# Patient Record
Sex: MALE | Race: WHITE | NOT HISPANIC OR LATINO | Employment: FULL TIME | URBAN - METROPOLITAN AREA
[De-identification: names, ages, dates, MRNs, and addresses within clinical notes are randomized per-mention and may not be internally consistent; named-entity substitution may affect disease eponyms.]

---

## 2023-03-03 LAB
ALANINE AMINOTRANSFERASE (SGPT) (U/L) IN SER/PLAS: 35 U/L (ref 10–52)
ALBUMIN (G/DL) IN SER/PLAS: 4.5 G/DL (ref 3.4–5)
ALKALINE PHOSPHATASE (U/L) IN SER/PLAS: 61 U/L (ref 33–120)
ANION GAP IN SER/PLAS: 12 MMOL/L (ref 10–20)
ASPARTATE AMINOTRANSFERASE (SGOT) (U/L) IN SER/PLAS: 24 U/L (ref 9–39)
BILIRUBIN TOTAL (MG/DL) IN SER/PLAS: 0.7 MG/DL (ref 0–1.2)
CALCIUM (MG/DL) IN SER/PLAS: 9.4 MG/DL (ref 8.6–10.3)
CARBON DIOXIDE, TOTAL (MMOL/L) IN SER/PLAS: 24 MMOL/L (ref 21–32)
CHLORIDE (MMOL/L) IN SER/PLAS: 106 MMOL/L (ref 98–107)
CHOLESTEROL (MG/DL) IN SER/PLAS: 145 MG/DL (ref 0–199)
CHOLESTEROL IN HDL (MG/DL) IN SER/PLAS: 49.8 MG/DL
CHOLESTEROL/HDL RATIO: 2.9
CREATININE (MG/DL) IN SER/PLAS: 0.9 MG/DL (ref 0.5–1.3)
GFR MALE: >90 ML/MIN/1.73M2
GLUCOSE (MG/DL) IN SER/PLAS: 98 MG/DL (ref 74–99)
LDL: 76 MG/DL (ref 0–99)
POTASSIUM (MMOL/L) IN SER/PLAS: 4.3 MMOL/L (ref 3.5–5.3)
PROTEIN TOTAL: 7.1 G/DL (ref 6.4–8.2)
SODIUM (MMOL/L) IN SER/PLAS: 138 MMOL/L (ref 136–145)
TRIGLYCERIDE (MG/DL) IN SER/PLAS: 95 MG/DL (ref 0–149)
UREA NITROGEN (MG/DL) IN SER/PLAS: 19 MG/DL (ref 6–23)
VLDL: 19 MG/DL (ref 0–40)

## 2023-03-04 LAB
ESTIMATED AVERAGE GLUCOSE FOR HBA1C: 114 MG/DL
HEMOGLOBIN A1C/HEMOGLOBIN TOTAL IN BLOOD: 5.6 %

## 2023-05-19 LAB
ACTIVATED PARTIAL THROMBOPLASTIN TIME IN PPP BY COAGULATION ASSAY: 33 SEC (ref 26–39)
ALANINE AMINOTRANSFERASE (SGPT) (U/L) IN SER/PLAS: 31 U/L (ref 10–52)
ALBUMIN (G/DL) IN SER/PLAS: 4.4 G/DL (ref 3.4–5)
ALKALINE PHOSPHATASE (U/L) IN SER/PLAS: 59 U/L (ref 33–120)
ANION GAP IN SER/PLAS: 10 MMOL/L (ref 10–20)
ASPARTATE AMINOTRANSFERASE (SGOT) (U/L) IN SER/PLAS: 23 U/L (ref 9–39)
BASOPHILS (10*3/UL) IN BLOOD BY AUTOMATED COUNT: 0.05 X10E9/L (ref 0–0.1)
BASOPHILS/100 LEUKOCYTES IN BLOOD BY AUTOMATED COUNT: 1 % (ref 0–2)
BILIRUBIN DIRECT (MG/DL) IN SER/PLAS: 0.1 MG/DL (ref 0–0.3)
BILIRUBIN TOTAL (MG/DL) IN SER/PLAS: 0.4 MG/DL (ref 0–1.2)
CALCIUM (MG/DL) IN SER/PLAS: 9.4 MG/DL (ref 8.6–10.3)
CARBON DIOXIDE, TOTAL (MMOL/L) IN SER/PLAS: 26 MMOL/L (ref 21–32)
CHLORIDE (MMOL/L) IN SER/PLAS: 106 MMOL/L (ref 98–107)
CREATININE (MG/DL) IN SER/PLAS: 0.91 MG/DL (ref 0.5–1.3)
EOSINOPHILS (10*3/UL) IN BLOOD BY AUTOMATED COUNT: 0.14 X10E9/L (ref 0–0.7)
EOSINOPHILS/100 LEUKOCYTES IN BLOOD BY AUTOMATED COUNT: 2.9 % (ref 0–6)
ERYTHROCYTE DISTRIBUTION WIDTH (RATIO) BY AUTOMATED COUNT: 13 % (ref 11.5–14.5)
ERYTHROCYTE MEAN CORPUSCULAR HEMOGLOBIN CONCENTRATION (G/DL) BY AUTOMATED: 33.3 G/DL (ref 32–36)
ERYTHROCYTE MEAN CORPUSCULAR VOLUME (FL) BY AUTOMATED COUNT: 93 FL (ref 80–100)
ERYTHROCYTES (10*6/UL) IN BLOOD BY AUTOMATED COUNT: 5.48 X10E12/L (ref 4.5–5.9)
GFR MALE: >90 ML/MIN/1.73M2
GLUCOSE (MG/DL) IN SER/PLAS: 99 MG/DL (ref 74–99)
HEMATOCRIT (%) IN BLOOD BY AUTOMATED COUNT: 50.7 % (ref 41–52)
HEMOGLOBIN (G/DL) IN BLOOD: 16.9 G/DL (ref 13.5–17.5)
IMMATURE GRANULOCYTES/100 LEUKOCYTES IN BLOOD BY AUTOMATED COUNT: 0.2 % (ref 0–0.9)
INR IN PPP BY COAGULATION ASSAY: 0.9 (ref 0.9–1.1)
LEUKOCYTES (10*3/UL) IN BLOOD BY AUTOMATED COUNT: 4.8 X10E9/L (ref 4.4–11.3)
LIPASE (U/L) IN SER/PLAS: 27 U/L (ref 9–82)
LYMPHOCYTES (10*3/UL) IN BLOOD BY AUTOMATED COUNT: 1.28 X10E9/L (ref 1.2–4.8)
LYMPHOCYTES/100 LEUKOCYTES IN BLOOD BY AUTOMATED COUNT: 26.7 % (ref 13–44)
MONOCYTES (10*3/UL) IN BLOOD BY AUTOMATED COUNT: 0.36 X10E9/L (ref 0.1–1)
MONOCYTES/100 LEUKOCYTES IN BLOOD BY AUTOMATED COUNT: 7.5 % (ref 2–10)
NEUTROPHILS (10*3/UL) IN BLOOD BY AUTOMATED COUNT: 2.95 X10E9/L (ref 1.2–7.7)
NEUTROPHILS/100 LEUKOCYTES IN BLOOD BY AUTOMATED COUNT: 61.7 % (ref 40–80)
PLATELETS (10*3/UL) IN BLOOD AUTOMATED COUNT: 247 X10E9/L (ref 150–450)
POTASSIUM (MMOL/L) IN SER/PLAS: 4.2 MMOL/L (ref 3.5–5.3)
PROTEIN TOTAL: 7.1 G/DL (ref 6.4–8.2)
PROTHROMBIN TIME (PT) IN PPP BY COAGULATION ASSAY: 10.6 SEC (ref 9.8–13.4)
SODIUM (MMOL/L) IN SER/PLAS: 138 MMOL/L (ref 136–145)
UREA NITROGEN (MG/DL) IN SER/PLAS: 22 MG/DL (ref 6–23)

## 2023-06-07 ENCOUNTER — HOSPITAL ENCOUNTER (OUTPATIENT)
Dept: DATA CONVERSION | Facility: HOSPITAL | Age: 48
End: 2023-06-08
Attending: SURGERY | Admitting: SURGERY
Payer: COMMERCIAL

## 2023-06-07 DIAGNOSIS — E66.9 OBESITY, UNSPECIFIED: ICD-10-CM

## 2023-06-07 DIAGNOSIS — E78.5 HYPERLIPIDEMIA, UNSPECIFIED: ICD-10-CM

## 2023-06-07 DIAGNOSIS — I10 ESSENTIAL (PRIMARY) HYPERTENSION: ICD-10-CM

## 2023-06-07 DIAGNOSIS — K42.9 UMBILICAL HERNIA WITHOUT OBSTRUCTION OR GANGRENE: ICD-10-CM

## 2023-06-07 DIAGNOSIS — K81.1 CHRONIC CHOLECYSTITIS: ICD-10-CM

## 2023-06-07 LAB — POCT GLUCOSE: 104 MG/DL (ref 74–99)

## 2023-07-03 ENCOUNTER — HOSPITAL ENCOUNTER (OUTPATIENT)
Dept: DATA CONVERSION | Facility: HOSPITAL | Age: 48
End: 2023-07-03
Attending: INTERNAL MEDICINE | Admitting: INTERNAL MEDICINE
Payer: COMMERCIAL

## 2023-07-03 DIAGNOSIS — E78.5 HYPERLIPIDEMIA, UNSPECIFIED: ICD-10-CM

## 2023-07-03 DIAGNOSIS — K63.5 POLYP OF COLON: ICD-10-CM

## 2023-07-03 DIAGNOSIS — K64.0 FIRST DEGREE HEMORRHOIDS: ICD-10-CM

## 2023-07-03 DIAGNOSIS — I10 ESSENTIAL (PRIMARY) HYPERTENSION: ICD-10-CM

## 2023-07-03 DIAGNOSIS — K57.30 DIVERTICULOSIS OF LARGE INTESTINE WITHOUT PERFORATION OR ABSCESS WITHOUT BLEEDING: ICD-10-CM

## 2023-07-03 DIAGNOSIS — Z86.010 PERSONAL HISTORY OF COLONIC POLYPS: ICD-10-CM

## 2023-07-03 DIAGNOSIS — Z12.11 ENCOUNTER FOR SCREENING FOR MALIGNANT NEOPLASM OF COLON: ICD-10-CM

## 2023-07-07 LAB
COMPLETE PATHOLOGY REPORT: NORMAL
CONVERTED CLINICAL DIAGNOSIS-HISTORY: NORMAL
CONVERTED FINAL DIAGNOSIS: NORMAL
CONVERTED FINAL REPORT PDF LINK TO COPY AND PASTE: NORMAL
CONVERTED GROSS DESCRIPTION: NORMAL

## 2023-08-02 LAB
ALANINE AMINOTRANSFERASE (SGPT) (U/L) IN SER/PLAS: 34 U/L (ref 10–52)
ALBUMIN (G/DL) IN SER/PLAS: 4.8 G/DL (ref 3.4–5)
ALKALINE PHOSPHATASE (U/L) IN SER/PLAS: 62 U/L (ref 33–120)
ANION GAP IN SER/PLAS: 14 MMOL/L (ref 10–20)
ASPARTATE AMINOTRANSFERASE (SGOT) (U/L) IN SER/PLAS: 25 U/L (ref 9–39)
BILIRUBIN TOTAL (MG/DL) IN SER/PLAS: 1 MG/DL (ref 0–1.2)
CALCIUM (MG/DL) IN SER/PLAS: 10.2 MG/DL (ref 8.6–10.3)
CARBON DIOXIDE, TOTAL (MMOL/L) IN SER/PLAS: 26 MMOL/L (ref 21–32)
CHLORIDE (MMOL/L) IN SER/PLAS: 102 MMOL/L (ref 98–107)
CHOLESTEROL (MG/DL) IN SER/PLAS: 165 MG/DL (ref 0–199)
CHOLESTEROL IN HDL (MG/DL) IN SER/PLAS: 47.6 MG/DL
CHOLESTEROL/HDL RATIO: 3.5
CREATININE (MG/DL) IN SER/PLAS: 0.84 MG/DL (ref 0.5–1.3)
GFR MALE: >90 ML/MIN/1.73M2
GLUCOSE (MG/DL) IN SER/PLAS: 96 MG/DL (ref 74–99)
LDL: 78 MG/DL (ref 0–99)
POTASSIUM (MMOL/L) IN SER/PLAS: 4.3 MMOL/L (ref 3.5–5.3)
PROTEIN TOTAL: 7.7 G/DL (ref 6.4–8.2)
SODIUM (MMOL/L) IN SER/PLAS: 138 MMOL/L (ref 136–145)
TRIGLYCERIDE (MG/DL) IN SER/PLAS: 196 MG/DL (ref 0–149)
UREA NITROGEN (MG/DL) IN SER/PLAS: 20 MG/DL (ref 6–23)
VLDL: 39 MG/DL (ref 0–40)

## 2023-08-03 LAB
ESTIMATED AVERAGE GLUCOSE FOR HBA1C: 123 MG/DL
HEMOGLOBIN A1C/HEMOGLOBIN TOTAL IN BLOOD: 5.9 %

## 2023-09-07 VITALS — HEIGHT: 72 IN | BODY MASS INDEX: 33.44 KG/M2 | WEIGHT: 246.91 LBS

## 2023-09-29 VITALS — HEIGHT: 72 IN | BODY MASS INDEX: 33.92 KG/M2 | WEIGHT: 250.44 LBS

## 2023-09-30 NOTE — H&P
History & Physical Reviewed:   I have reviewed the History and Physical dated:  19-May-2023   History and Physical reviewed and relevant findings noted. Patient examined to review pertinent physical  findings.: No significant changes   Home Medications Reviewed: no changes noted   Allergies Reviewed: no changes noted       ERAS (Enhanced Recovery After Surgery):  ·  ERAS Patient: no     Consent:   COVID-19 Consent:  ·  COVID-19 Risk Consent Surgeon has reviewed key risks related to the risk of estefnai COVID-19 and if they contract COVID-19 what the risks are.       Electronic Signatures:  Giorgi Trevino)  (Signed 07-Jun-2023 09:03)   Authored: History & Physical Reviewed, ERAS, Consent,  Note Completion      Last Updated: 07-Jun-2023 09:03 by Giorgi Trevino)

## 2023-09-30 NOTE — DISCHARGE SUMMARY
Send Summary:   Discharge Summary Providers:  Provider Role Provider Name   · Primary Carin Linda       Note Recipients: Carin Linda, PAC - [phone]  []         Discharge:    Summary:   Admission Date: .07-Jun-2023 08:26:00   Discharge Date: 08-Jun-2023   Attending Physician at Discharge: Uriel   Admission Reason: Lap eren and umbilical hernia  repair   Final Discharge Diagnoses: S/P laparoscopic cholecystectomy   Procedures: Date: 07-Jun-2023 12:32:00  Procedure Name: 1.  Laparoscopic cholecystectomy, umbilical hernia repair   Condition at Discharge: Stable   Disposition at Discharge: Stable   Vital Signs:        T   P  R  BP   MAP  SpO2   Value  36.7  84  18  129/73      96%  Date/Time 6/8 6:22 6/8 6:22 6/8 6:22 6/8 6:22    6/8 6:22  Range  (35.8C - 37.1C )  (76 - 91 )  (18 - 18 )  (119 - 151 )/ (69 - 83 )    (95% - 98% )  Highest temp of 37.1 C was recorded at 6/7 22:09    Date:            Weight/Scale Type:  Height:   07-Jun-2023 14:32  112  kg         182.8  cm    Physical Exam:    Abd soft, appropriately tender, epigastric wound packing changed with iodoform - bloody drainage  Umbilical wound dressing in place  All other wounds with skin glue intact    Hospital Course:     was admitted postoperatively for wound care management and to monitor his VS.  His hospital course was expected.  Was tolerating diet with PO intake, no  n/v, pain was controlled with PO medication and he remained AVSS.  Was deemed suitable for discharge on POD1.         Discharge Information:    and Continuing Care:   Lab Results - Pending:    Surgical Pathology Drawn at 07-Jun-2023 12:16:00  Surgical Pathology Drawn at 07-Jun-2023 12:06:00  Radiology Results - Pending: None   Discharge Instructions:    Activity:           activity as tolerated.          May shower..  Friday 9th June 2023          May not return to school/work until follow-up visit with.            May not drive while taking  narcotics.            No pushing, pulling, or lifting objects greater than 10 pounds for 6.            No tub baths, no swimming until seen in office    Nutrition/Diet:           regular    Wound Care:           Wound Type:   surgical incision          Change Dressing:   as needed          Cleanse With:   soap and water          Pack With:   nu-gauze / packing strips,  Change twice a day          Cover With:   4x4 gauze,  no dressing, leave open to air          Tape With:   paper tape          Instructions:   no lotions, creams, or tub soaks          Other Instructions:   Change packing twice a day until seen in the office.  Can remove belly-button dressing on 9th June and keep open to air.  Keep all other wounds open to air.  Skin glue will peel off on its own.    Follow Up Appointments:    Follow-Up Appointment 01:           Physician/Dept/Service:   /Surgery          Scheduled Date/Time:   15-Ahmet-2023 11:00          Location:   [facility], [address] [place] [address], [address],          Phone Number:   [phone]    Discharge Medications: Home Medication    mg oral tablet - 1 tab(s) orally 3 times a day   atorvastatin 20 mg oral tablet - 1 tab(s) orally once a day  lisinopril 40 mg oral tablet - 1 tab(s) orally once a day  topiramate 50 mg oral tablet - 1 tab(s) orally 2 times a day  Tylenol 325 mg oral tablet - 2 tab(s) orally every 6 hours   amoxicillin-clavulanate 875 mg-125 mg oral tablet - 1 tab(s) orally 3 times a day   hydroCHLOROthiazide 12.5 mg oral tablet - 1 tab(s) orally once a day     PRN Medication   Colace 100 mg oral capsule - 1 cap(s) orally once a day, As Needed -for constipation   oxyCODONE 5 mg oral capsule - 1 cap(s) orally 4 times a day, As Needed -for breakthrough pain     DNR Status:   ·  Code Status Code Status order at time of discharge: Full Code       Electronic Signatures:  Giorgi Trevino)  (Signed 08-Jun-2023 08:17)   Authored:  Send Summary, Summary Content, Ongoing Care,  DNR Status, Note Completion      Last Updated: 08-Jun-2023 08:17 by Giorgi Trevino)

## 2023-09-30 NOTE — PROGRESS NOTES
Service: Acute Care Surgery     Subjective Data:   JOSE NJ is a 48 year old Male who is Hospital Day # 2 and POD #1 for 1.  Laparoscopic cholecystectomy, umbilical hernia repair ;     No issues overnight.  Jasbir liqs.  Voided multiple times.    Objective Data:     Objective Information:      T   P  R  BP   MAP  SpO2   Value  36.7  84  18  129/73      96%  Date/Time 6/8 6:22 6/8 6:22 6/8 6:22 6/8 6:22    6/8 6:22  Range  (35.8C - 37.1C )  (76 - 91 )  (18 - 18 )  (119 - 151 )/ (69 - 83 )    (95% - 98% )  Highest temp of 37.1 C was recorded at 6/7 22:09      Pain reported at 6/7 20:45: 3 = Mild    ---- Intake and Output  -----  Mn/Dy/Year Time  Intake   Output  Net  Jun 8, 2023 6:00 am  0   0  0  Jun 7, 2023 10:00 pm  350   0  350  Jun 7, 2023 2:00 pm  2340   550  1790    The Intake and Output Totals for the last 24 hours are:      Intake   Output  Net      4031   550  2140    Physical Exam Narrative:  ·  Physical Exam:    NAD  RRR  Resp soft  Abd soft, epigastric wound repacked; all other wounds dry with skin glue  Umbilical dressing intact  WWP    Medication:    Medications:          Continuous Medications       --------------------------------  No continuous medications are active       Scheduled Medications       --------------------------------    1. Acetaminophen:  650  mg  Oral  Every 4 Hours    2. Atorvastatin:  20  mg  Oral  Daily    3. Docusate:  100  mg  Oral  2 Times a Day    4. Enoxaparin SubCutaneous:  40  mg  SubCutaneous  Every 24 Hours    5. hydroCHLOROthiazide:  12.5  mg  Oral  Daily    6. Ketorolac Injectable:  15  mg  IntraVenous Push  Every 6 Hours    7. Lisinopril:  40  mg  Oral  Daily    8. Piperacillin - Tazobactam 3.375 gram/Iso-osmotic 50 mL Premix IVPB:  50  mL  IntraVenous Piggyback  Every 8 Hours    9. Topiramate:  50  mg  Oral  2 Times a Day         PRN Medications       --------------------------------    1. Acetaminophen:  650  mg  Oral  Every 4 Hours    2. Ondansetron  Injectable:  4  mg  IntraVenous Push  Every 4 Hours    3. oxyCODONE Immediate Release:  5  mg  Oral  Every 6 Hours    4. oxyCODONE Immediate Release:  10  mg  Oral  Every 6 Hours    5. Sodium Chloride 0.9% Injectable Flush:  10  mL  IntraVenous Flush  Every 8 Hours and as Needed           Currently Suspended Medications       --------------------------------    1. Ibuprofen:  400  mg  Oral  Every 8 Hours      Assessment and Plan:   Code Status:  ·  Code Status Full Code     Assessment:    48M POD1 s/p lap eren and UHR doing well  - reg diet  - dc IVF  - twice a day dressing changes for epigastric wound  - cont PO Abx for further 5 days  - f/u in office  - dc when patient ready      Electronic Signatures:  Giorgi Trevino)  (Signed 08-Jun-2023 07:59)   Authored: Service, Subjective Data, Objective Data, Assessment  and Plan, Note Completion      Last Updated: 08-Jun-2023 07:59 by Giorgi Trevino)

## 2023-10-02 NOTE — OP NOTE
PROCEDURE DETAILS    Preoperative Diagnosis:  Chronic cholecystitis, K81.1  Umbilical hernia, K42.9    Postoperative Diagnosis:  Chronic cholecystitis, K81.1  Umbilical hernia, K42.9    Surgeon: Giorgi Trevino  Resident/Fellow/Other Assistant: Brandi Bender    Procedure:  1.  Laparoscopic cholecystectomy, umbilical hernia repair     Anesthesia: No anesthesiologist associated with this case  Estimated Blood Loss: 50ml  Findings: Large distended gallbladder with large stone, spillage of gallbladder contents at liver bed and on attempt to remove it in the endobag through the subxiphoid port.    Specimens(s) Collected: yes,  Hilaria         Operative Report:   The patient was brought to the operating room and placed supine on the operating table.  Sequential compression devices were put on both lower extremities.   Arms were left untucked by the side of the patient.  IV antibiotics were infused.  An informal huddle was performed verifying the correct patient and procedures to be performed.  General endotracheal anaesthesia was induced.  A kumar catheter was placed  under sterile conditions.  The abdomen was prepped and draped in a sterile fashion. A formal timeout was performed.   Local anaesthetic agent was infiltrated intradermally just above the umbilicus superior to the hernia defect.  An incision was made in this supraumbilical region.  This was deepened down to the fascia which was then grasped with Kocher clamps.  A vertical  incision was made through the fascia and entry into the abdominal cavity was confirmed with a finger sweep and visual confirmation.  Two #0 vicryl stay sutures were placed on both aspects of the fascial incision.  The Stevens port was inserted.  The abdomen  was then insufflated to approximately 12-15 mmHg.  The camera and laparoscope were introduced through the Quin port.  The abdomen was then scanned - there no injuries noted on insertion of the port.  Three accessory  ports were placed under direct visualisation:  a 12mm port in the subxiphoid region, a 5mm port in the midclavicular line and inferior to the subcostal margin, and an additional 5mm port in the midaxillary line inferior to the subcostal margin.  The patient was then placed in reverse Trendelenburg  position and rolled to the left.   The assistant then grasped the fundus of the gallbladder pushing above the dome of liver. There were dense omental adhesions up to the gallbladder and to the liver. These were taken down using blunt dissection and cautery. This took some time to get it  freed up. We were able to finally get down to the area of the hepatoduodenal ligament. We had to cauterize the liver in several locations to control the bleeding. We were able then to dissect out the hepatoduodenal ligament. The cystic duct was identified  and visualized down to its junction with the common bile duct. The entire biliary window was dissected out. The cystic artery was identified. There were no accessory bile ducts, and the anatomy was well visualized. Two clips were placed proximally on  the cystic duct and one distally and then divided. Two clips were placed proximally on the cystic artery and one distally and then divided. The gallbladder was slowly and carefully taken off the liver bed using electrocautery.  At one point, we did enter  the gallbladder with spillage of bile which was suctioned off.  Once it was free, the liver bed was checked. There were additional bleeding points at the gallbladder bed.  This was controlled with electrocautery and Leena was also applied to this area.     The gallbladder was then placed in an endobag.  We initially attempted to remove the bag with its contents through the 12mm subxiphoid port but in doing so, spilled some of the contents into the wound.  We then elected to bring out the bag through the  umbilical port.  After doing so, the gallbladder bed was checked one last time -  there were no bleeding points.  The right upper quadrant was irrigated and suctioned dry. Each of the ports was removed under direct visualization. The abdomen was desufflated.   We then performed the umbilical hernia repair.  The patient had an umbilical defect that measured slightly larger than the tip of my last finger.   The incision was extended inferiorly around the umbilicus to gain better exposure around the defect.  Once this was done, the fascial defect was incorporated into the initial vertical incision placed for the Quin port.  The fascial defect was then  closed with interrupted #2-0 vicryl sutures.  Due to the spillage some gallbladder contents into the subxiphoid wound, we elected to keep the wound open but instead pack it with iodoform dressing.  All other skin incisions, including the umbilical hernia  site, were then closed with #4-0 monocryl.  Dermabond was placed on all incisions.  Once dry, and ABD pad was loosely placed over the umbilical incision and the abdominal binder was placed on the patient.  The patient was successfully extubated and brought  to the PACU in stable condition.  All needles, instruments, and sponges were correct to the count.  I was present for the entire procedure and performed it without residents but with surgical assistants.                          Attestation:   Note Completion:  Attending Attestation I performed the procedure without a resident         Electronic Signatures:  Giorgi Trevino)  (Signed 08-Jun-2023 12:10)   Authored: Post-Operative Note, Chart Review, Note Completion      Last Updated: 08-Jun-2023 12:10 by Giorgi Trevino)

## 2024-01-15 ENCOUNTER — LAB (OUTPATIENT)
Dept: LAB | Facility: LAB | Age: 49
End: 2024-01-15
Payer: COMMERCIAL

## 2024-01-15 ENCOUNTER — OFFICE VISIT (OUTPATIENT)
Dept: PRIMARY CARE | Facility: CLINIC | Age: 49
End: 2024-01-15
Payer: COMMERCIAL

## 2024-01-15 VITALS
SYSTOLIC BLOOD PRESSURE: 132 MMHG | HEART RATE: 94 BPM | DIASTOLIC BLOOD PRESSURE: 70 MMHG | BODY MASS INDEX: 34.72 KG/M2 | WEIGHT: 256.38 LBS | OXYGEN SATURATION: 97 % | HEIGHT: 72 IN

## 2024-01-15 DIAGNOSIS — R73.9 HYPERGLYCEMIA: ICD-10-CM

## 2024-01-15 DIAGNOSIS — I10 PRIMARY HYPERTENSION: Primary | ICD-10-CM

## 2024-01-15 DIAGNOSIS — E78.2 MIXED HYPERLIPIDEMIA: ICD-10-CM

## 2024-01-15 DIAGNOSIS — I10 PRIMARY HYPERTENSION: ICD-10-CM

## 2024-01-15 DIAGNOSIS — N52.9 ERECTILE DYSFUNCTION, UNSPECIFIED ERECTILE DYSFUNCTION TYPE: ICD-10-CM

## 2024-01-15 DIAGNOSIS — E66.9 OBESITY (BMI 30-39.9): ICD-10-CM

## 2024-01-15 PROBLEM — K42.9 UMBILICAL HERNIA: Status: RESOLVED | Noted: 2023-06-07 | Resolved: 2024-01-15

## 2024-01-15 PROBLEM — K63.5 POLYP OF COLON: Status: ACTIVE | Noted: 2024-01-15

## 2024-01-15 PROBLEM — Z86.0100 HISTORY OF COLONIC POLYPS: Status: ACTIVE | Noted: 2023-07-03

## 2024-01-15 PROBLEM — K82.9 GALLBLADDER PAIN: Status: RESOLVED | Noted: 2023-03-03 | Resolved: 2024-01-15

## 2024-01-15 PROBLEM — K57.90 DIVERTICULAR DISEASE: Status: ACTIVE | Noted: 2023-07-03

## 2024-01-15 PROBLEM — R10.11 RIGHT UPPER QUADRANT ABDOMINAL PAIN: Status: ACTIVE | Noted: 2024-01-15

## 2024-01-15 PROBLEM — E78.5 HYPERLIPIDEMIA: Status: ACTIVE | Noted: 2023-07-03

## 2024-01-15 PROBLEM — M19.90 ARTHRITIS: Status: ACTIVE | Noted: 2024-01-15

## 2024-01-15 PROBLEM — K64.9 HEMORRHOIDS: Status: ACTIVE | Noted: 2023-07-03

## 2024-01-15 PROBLEM — Z86.010 HISTORY OF COLONIC POLYPS: Status: ACTIVE | Noted: 2023-07-03

## 2024-01-15 PROBLEM — K82.9 GALLBLADDER ATTACK: Status: RESOLVED | Noted: 2024-01-15 | Resolved: 2024-01-15

## 2024-01-15 PROBLEM — F50.9 EATING DISORDER: Status: RESOLVED | Noted: 2024-01-15 | Resolved: 2024-01-15

## 2024-01-15 LAB
ALBUMIN SERPL BCP-MCNC: 4.7 G/DL (ref 3.4–5)
ALP SERPL-CCNC: 58 U/L (ref 33–120)
ALT SERPL W P-5'-P-CCNC: 34 U/L (ref 10–52)
ANION GAP SERPL CALC-SCNC: 13 MMOL/L (ref 10–20)
AST SERPL W P-5'-P-CCNC: 22 U/L (ref 9–39)
BILIRUB SERPL-MCNC: 0.7 MG/DL (ref 0–1.2)
BUN SERPL-MCNC: 24 MG/DL (ref 6–23)
CALCIUM SERPL-MCNC: 10.1 MG/DL (ref 8.6–10.3)
CHLORIDE SERPL-SCNC: 102 MMOL/L (ref 98–107)
CO2 SERPL-SCNC: 26 MMOL/L (ref 21–32)
CREAT SERPL-MCNC: 0.92 MG/DL (ref 0.5–1.3)
EGFRCR SERPLBLD CKD-EPI 2021: >90 ML/MIN/1.73M*2
GLUCOSE SERPL-MCNC: 108 MG/DL (ref 74–99)
POTASSIUM SERPL-SCNC: 4 MMOL/L (ref 3.5–5.3)
PROT SERPL-MCNC: 7.1 G/DL (ref 6.4–8.2)
SODIUM SERPL-SCNC: 137 MMOL/L (ref 136–145)

## 2024-01-15 PROCEDURE — 3075F SYST BP GE 130 - 139MM HG: CPT | Performed by: PHYSICIAN ASSISTANT

## 2024-01-15 PROCEDURE — 99214 OFFICE O/P EST MOD 30 MIN: CPT | Performed by: PHYSICIAN ASSISTANT

## 2024-01-15 PROCEDURE — 3078F DIAST BP <80 MM HG: CPT | Performed by: PHYSICIAN ASSISTANT

## 2024-01-15 PROCEDURE — 36415 COLL VENOUS BLD VENIPUNCTURE: CPT

## 2024-01-15 PROCEDURE — 83036 HEMOGLOBIN GLYCOSYLATED A1C: CPT

## 2024-01-15 PROCEDURE — 1036F TOBACCO NON-USER: CPT | Performed by: PHYSICIAN ASSISTANT

## 2024-01-15 PROCEDURE — 3008F BODY MASS INDEX DOCD: CPT | Performed by: PHYSICIAN ASSISTANT

## 2024-01-15 PROCEDURE — 80053 COMPREHEN METABOLIC PANEL: CPT

## 2024-01-15 RX ORDER — HYDROCHLOROTHIAZIDE 12.5 MG/1
12.5 TABLET ORAL DAILY
Qty: 90 TABLET | Refills: 1 | Status: SHIPPED | OUTPATIENT
Start: 2024-01-15 | End: 2024-07-13

## 2024-01-15 RX ORDER — TOPIRAMATE 50 MG/1
TABLET, FILM COATED ORAL
COMMUNITY
Start: 2020-02-03 | End: 2024-01-15 | Stop reason: SDUPTHER

## 2024-01-15 RX ORDER — LISINOPRIL 40 MG/1
40 TABLET ORAL DAILY
COMMUNITY
End: 2024-01-15 | Stop reason: SDUPTHER

## 2024-01-15 RX ORDER — SILDENAFIL 100 MG/1
100 TABLET, FILM COATED ORAL AS NEEDED
Qty: 90 TABLET | Refills: 0 | Status: SHIPPED | OUTPATIENT
Start: 2024-01-15 | End: 2024-04-14

## 2024-01-15 RX ORDER — ATORVASTATIN CALCIUM 20 MG/1
20 TABLET, FILM COATED ORAL DAILY
COMMUNITY
Start: 2020-02-03 | End: 2024-01-15 | Stop reason: SDUPTHER

## 2024-01-15 RX ORDER — TOPIRAMATE 50 MG/1
50 TABLET, FILM COATED ORAL 2 TIMES DAILY
Qty: 180 TABLET | Refills: 0 | Status: SHIPPED | OUTPATIENT
Start: 2024-01-15 | End: 2024-04-14

## 2024-01-15 RX ORDER — SILDENAFIL 100 MG/1
TABLET, FILM COATED ORAL
COMMUNITY
End: 2024-01-15 | Stop reason: SDUPTHER

## 2024-01-15 RX ORDER — LISINOPRIL 40 MG/1
40 TABLET ORAL DAILY
Qty: 90 TABLET | Refills: 1 | Status: SHIPPED | OUTPATIENT
Start: 2024-01-15 | End: 2024-07-13

## 2024-01-15 RX ORDER — ATORVASTATIN CALCIUM 20 MG/1
20 TABLET, FILM COATED ORAL DAILY
Qty: 90 TABLET | Refills: 1 | Status: SHIPPED | OUTPATIENT
Start: 2024-01-15 | End: 2024-07-13

## 2024-01-15 RX ORDER — HYDROCHLOROTHIAZIDE 12.5 MG/1
12.5 TABLET ORAL DAILY
COMMUNITY
End: 2024-01-15 | Stop reason: SDUPTHER

## 2024-01-15 ASSESSMENT — ENCOUNTER SYMPTOMS
PSYCHIATRIC NEGATIVE: 1
NEUROLOGICAL NEGATIVE: 1
PALPITATIONS: 0
GASTROINTESTINAL NEGATIVE: 1
CONSTITUTIONAL NEGATIVE: 1
SHORTNESS OF BREATH: 0
WHEEZING: 0
RESPIRATORY NEGATIVE: 1
MUSCULOSKELETAL NEGATIVE: 1
COUGH: 0
CARDIOVASCULAR NEGATIVE: 1

## 2024-01-15 NOTE — PROGRESS NOTES
Subjective   Patient ID: Salazar Major is a 48 y.o. male who presents for Med Refill.    HPI Pt is here for refills of medications to treat the following medical conditions: HTN, hyperlipidemia, ED and obesity. Unless otherwise stated, these conditions are well-controlled, pt is taking medications s Rx, and there are no reported side effects to medications or other treatment.     Review of Systems   Constitutional: Negative.    HENT: Negative.     Respiratory: Negative.  Negative for cough, shortness of breath and wheezing.    Cardiovascular: Negative.  Negative for chest pain and palpitations.   Gastrointestinal: Negative.    Genitourinary: Negative.    Musculoskeletal: Negative.    Skin: Negative.    Neurological: Negative.    Psychiatric/Behavioral: Negative.     All other systems reviewed and are negative.      Objective   /70 (BP Location: Right arm, Patient Position: Sitting, BP Cuff Size: Large adult)   Pulse 94   Ht 1.829 m (6')   Wt 116 kg (256 lb 6.1 oz)   SpO2 97%   BMI 34.77 kg/m²     Physical Exam  Vitals and nursing note reviewed.   Constitutional:       General: He is not in acute distress.     Appearance: Normal appearance. He is obese. He is not ill-appearing.   HENT:      Head: Normocephalic and atraumatic.      Nose: Nose normal.      Mouth/Throat:      Mouth: Mucous membranes are moist.      Pharynx: Oropharynx is clear.   Eyes:      Conjunctiva/sclera: Conjunctivae normal.   Cardiovascular:      Rate and Rhythm: Normal rate and regular rhythm.      Heart sounds: Normal heart sounds.   Pulmonary:      Effort: Pulmonary effort is normal.      Breath sounds: Normal breath sounds. No wheezing.   Musculoskeletal:         General: Normal range of motion.   Lymphadenopathy:      Cervical: No cervical adenopathy.   Skin:     General: Skin is warm and dry.   Neurological:      General: No focal deficit present.      Mental Status: He is alert and oriented to person, place, and time.    Psychiatric:         Mood and Affect: Mood normal.         Behavior: Behavior normal.         Thought Content: Thought content normal.         Judgment: Judgment normal.         Assessment/Plan Salazar Major presents for chronic medication refills without complaint.  He appear to be doing well - his exam was unremarkable. Will renew chronic medication(s) without changes and check labs. He will follow up if necessary once labs are reviewed otherwise we will see him back when he is  due for refills - sooner if he has  any complications.       Diagnoses and all orders for this visit:  Primary hypertension  -     Comprehensive Metabolic Panel; Future  -     hydroCHLOROthiazide (HYDRODiuril) 12.5 mg tablet; Take 1 tablet (12.5 mg) by mouth once daily.  -     lisinopril 40 mg tablet; Take 1 tablet (40 mg) by mouth once daily.  Mixed hyperlipidemia  -     Comprehensive Metabolic Panel; Future  -     atorvastatin (Lipitor) 20 mg tablet; Take 1 tablet (20 mg) by mouth once daily.  Hyperglycemia  -     Hemoglobin A1C; Future  Erectile dysfunction, unspecified erectile dysfunction type  -     sildenafil (Viagra) 100 mg tablet; Take 1 tablet (100 mg) by mouth if needed for erectile dysfunction. Take 1 hour prior to needing - no more than 1 tablet in 24 hours  Obesity (BMI 30-39.9)  -     topiramate (Topamax) 50 mg tablet; Take 1 tablet (50 mg) by mouth 2 times a day.

## 2024-01-16 LAB
EST. AVERAGE GLUCOSE BLD GHB EST-MCNC: 117 MG/DL
HBA1C MFR BLD: 5.7 %

## 2024-07-05 ENCOUNTER — APPOINTMENT (OUTPATIENT)
Dept: PRIMARY CARE | Facility: CLINIC | Age: 49
End: 2024-07-05
Payer: COMMERCIAL

## 2024-07-10 ENCOUNTER — APPOINTMENT (OUTPATIENT)
Dept: PRIMARY CARE | Facility: CLINIC | Age: 49
End: 2024-07-10
Payer: COMMERCIAL

## 2024-07-10 ENCOUNTER — LAB (OUTPATIENT)
Dept: LAB | Facility: LAB | Age: 49
End: 2024-07-10
Payer: COMMERCIAL

## 2024-07-10 VITALS
SYSTOLIC BLOOD PRESSURE: 130 MMHG | DIASTOLIC BLOOD PRESSURE: 76 MMHG | WEIGHT: 249.2 LBS | BODY MASS INDEX: 33.75 KG/M2 | HEIGHT: 72 IN | HEART RATE: 109 BPM | OXYGEN SATURATION: 96 %

## 2024-07-10 DIAGNOSIS — E78.2 MIXED HYPERLIPIDEMIA: ICD-10-CM

## 2024-07-10 DIAGNOSIS — I10 PRIMARY HYPERTENSION: ICD-10-CM

## 2024-07-10 DIAGNOSIS — N52.9 ERECTILE DYSFUNCTION, UNSPECIFIED ERECTILE DYSFUNCTION TYPE: ICD-10-CM

## 2024-07-10 DIAGNOSIS — R73.9 HYPERGLYCEMIA: ICD-10-CM

## 2024-07-10 DIAGNOSIS — R73.9 HYPERGLYCEMIA: Primary | ICD-10-CM

## 2024-07-10 DIAGNOSIS — E66.9 OBESITY (BMI 30-39.9): ICD-10-CM

## 2024-07-10 LAB
ALBUMIN SERPL BCP-MCNC: 4.8 G/DL (ref 3.4–5)
ALP SERPL-CCNC: 58 U/L (ref 33–120)
ALT SERPL W P-5'-P-CCNC: 43 U/L (ref 10–52)
ANION GAP SERPL CALC-SCNC: 15 MMOL/L (ref 10–20)
AST SERPL W P-5'-P-CCNC: 32 U/L (ref 9–39)
BILIRUB SERPL-MCNC: 0.9 MG/DL (ref 0–1.2)
BUN SERPL-MCNC: 29 MG/DL (ref 6–23)
CALCIUM SERPL-MCNC: 10.3 MG/DL (ref 8.6–10.3)
CHLORIDE SERPL-SCNC: 100 MMOL/L (ref 98–107)
CHOLEST SERPL-MCNC: 198 MG/DL (ref 0–199)
CHOLESTEROL/HDL RATIO: 3.5
CO2 SERPL-SCNC: 22 MMOL/L (ref 21–32)
CREAT SERPL-MCNC: 0.98 MG/DL (ref 0.5–1.3)
EGFRCR SERPLBLD CKD-EPI 2021: >90 ML/MIN/1.73M*2
GLUCOSE SERPL-MCNC: 93 MG/DL (ref 74–99)
HDLC SERPL-MCNC: 56.5 MG/DL
LDLC SERPL CALC-MCNC: 110 MG/DL
NON HDL CHOLESTEROL: 142 MG/DL (ref 0–149)
POTASSIUM SERPL-SCNC: 3.7 MMOL/L (ref 3.5–5.3)
PROT SERPL-MCNC: 7.7 G/DL (ref 6.4–8.2)
SODIUM SERPL-SCNC: 133 MMOL/L (ref 136–145)
TRIGL SERPL-MCNC: 160 MG/DL (ref 0–149)
VLDL: 32 MG/DL (ref 0–40)

## 2024-07-10 PROCEDURE — 80053 COMPREHEN METABOLIC PANEL: CPT

## 2024-07-10 PROCEDURE — 3075F SYST BP GE 130 - 139MM HG: CPT | Performed by: PHYSICIAN ASSISTANT

## 2024-07-10 PROCEDURE — 1036F TOBACCO NON-USER: CPT | Performed by: PHYSICIAN ASSISTANT

## 2024-07-10 PROCEDURE — 3078F DIAST BP <80 MM HG: CPT | Performed by: PHYSICIAN ASSISTANT

## 2024-07-10 PROCEDURE — 99214 OFFICE O/P EST MOD 30 MIN: CPT | Performed by: PHYSICIAN ASSISTANT

## 2024-07-10 PROCEDURE — 80061 LIPID PANEL: CPT

## 2024-07-10 PROCEDURE — 36415 COLL VENOUS BLD VENIPUNCTURE: CPT

## 2024-07-10 PROCEDURE — 83036 HEMOGLOBIN GLYCOSYLATED A1C: CPT

## 2024-07-10 RX ORDER — ATORVASTATIN CALCIUM 20 MG/1
20 TABLET, FILM COATED ORAL DAILY
Qty: 90 TABLET | Refills: 1 | Status: SHIPPED | OUTPATIENT
Start: 2024-07-10 | End: 2025-01-06

## 2024-07-10 RX ORDER — SILDENAFIL 100 MG/1
100 TABLET, FILM COATED ORAL AS NEEDED
Qty: 90 TABLET | Refills: 1 | Status: SHIPPED | OUTPATIENT
Start: 2024-07-10 | End: 2025-01-06

## 2024-07-10 RX ORDER — TOPIRAMATE 50 MG/1
50 TABLET, FILM COATED ORAL 2 TIMES DAILY
Qty: 180 TABLET | Refills: 1 | Status: SHIPPED | OUTPATIENT
Start: 2024-07-10 | End: 2025-01-06

## 2024-07-10 RX ORDER — LISINOPRIL 40 MG/1
40 TABLET ORAL DAILY
Qty: 90 TABLET | Refills: 1 | Status: SHIPPED | OUTPATIENT
Start: 2024-07-10 | End: 2025-01-06

## 2024-07-10 RX ORDER — HYDROCHLOROTHIAZIDE 12.5 MG/1
12.5 TABLET ORAL DAILY
Qty: 90 TABLET | Refills: 1 | Status: SHIPPED | OUTPATIENT
Start: 2024-07-10 | End: 2025-01-06

## 2024-07-10 ASSESSMENT — PATIENT HEALTH QUESTIONNAIRE - PHQ9
2. FEELING DOWN, DEPRESSED OR HOPELESS: NOT AT ALL
1. LITTLE INTEREST OR PLEASURE IN DOING THINGS: NOT AT ALL
SUM OF ALL RESPONSES TO PHQ9 QUESTIONS 1 AND 2: 0

## 2024-07-10 NOTE — PROGRESS NOTES
Subjective   Patient ID: Salazar Major is a 49 y.o. male who presents for Med Refill.    HPI Pt is here for refills of medications to treat the following medical conditions. Unless otherwise stated, these conditions are well-controlled, pt is taking medications s Rx, and there are no reported side effects to medications or other treatment: ED, Hyperlipidemia, Obesity, HTN    Patient is fasting today for blood work    Review of Systems  Constitutional: Negative.    HENT: Negative.     Respiratory: Negative.  Negative for cough, shortness of breath and wheezing.    Cardiovascular: Negative.  Negative for chest pain and palpitations.   Gastrointestinal: Negative.    Musculoskeletal: Negative.    Neurological: Negative.    Hematological: Negative.    Psychiatric/Behavioral: Negative.     All other systems reviewed and are negative.      Objective   /76 (BP Location: Right arm, Patient Position: Sitting, BP Cuff Size: Large adult)   Pulse 109   Ht 1.829 m (6')   Wt 113 kg (249 lb 3.2 oz)   SpO2 96%   BMI 33.80 kg/m²     Physical Exam  Vitals and nursing note reviewed.   Constitutional:       General Appearance: Normal appearance, no distress, not ill-appearing.   HENT:      Head: Normocephalic and atraumatic.      Mouth/Throat:  MMM, Oropharynx is clear without erythema or exudate  Eyes:      Conjunctiva/sclera: Conjunctivae normal.   Cardiovascular:      Normal rate and regular rhythm: Normal heart sounds.   Pulmonary:      Pulmonary effort is normal. Normal breath sounds. No wheezing.   Abdominal:      General: Bowel sounds are normal. Abdomen is soft, non- tender, no masses.  Musculoskeletal:         General: Normal range of motion.    Lymphadenopathy:      Cervical:  Supple, non-tender, no cervical adenopathy.   Skin:     General: Skin is warm and dry, no rash or jaundice.    Neurological:      No focal deficit present. Alert and oriented to person, place, and time.   Psychiatric:         Mood and Affect:  Mood normal.         Behavior: Behavior normal.         Thought Content: Thought content normal.         Judgment: Judgment normal.       Assessment/Plan  Salazar Major presents for chronic medication refills without complaint.  He appears to be doing well - his exam was unremarkable. Will renew chronic medication(s) without changes and check labs. He will follow up if necessary once labs are reviewed otherwise we will see him back when he is  due for refills - sooner if he has  any complications.    Diagnoses and all orders for this visit:  Hyperglycemia  -     Hemoglobin A1C; Future  Mixed hyperlipidemia  -     atorvastatin (Lipitor) 20 mg tablet; Take 1 tablet (20 mg) by mouth once daily.  -     Lipid Panel; Future  -     Comprehensive Metabolic Panel; Future  Primary hypertension  -     hydroCHLOROthiazide (Microzide) 12.5 mg tablet; Take 1 tablet (12.5 mg) by mouth once daily.  -     lisinopril 40 mg tablet; Take 1 tablet (40 mg) by mouth once daily.  -     Comprehensive Metabolic Panel; Future  Erectile dysfunction, unspecified erectile dysfunction type  -     sildenafil (Viagra) 100 mg tablet; Take 1 tablet (100 mg) by mouth if needed for erectile dysfunction. Take 1 hour prior to needing - no more than 1 tablet in 24 hours  Obesity (BMI 30-39.9)  -     topiramate (Topamax) 50 mg tablet; Take 1 tablet (50 mg) by mouth 2 times a day.

## 2024-07-11 LAB
EST. AVERAGE GLUCOSE BLD GHB EST-MCNC: 120 MG/DL
HBA1C MFR BLD: 5.8 %

## 2025-01-20 ENCOUNTER — LAB (OUTPATIENT)
Dept: LAB | Facility: LAB | Age: 50
End: 2025-01-20
Payer: COMMERCIAL

## 2025-01-20 ENCOUNTER — APPOINTMENT (OUTPATIENT)
Dept: PRIMARY CARE | Facility: CLINIC | Age: 50
End: 2025-01-20
Payer: COMMERCIAL

## 2025-01-20 VITALS
SYSTOLIC BLOOD PRESSURE: 128 MMHG | WEIGHT: 256.6 LBS | TEMPERATURE: 97.9 F | BODY MASS INDEX: 34.75 KG/M2 | OXYGEN SATURATION: 96 % | DIASTOLIC BLOOD PRESSURE: 74 MMHG | HEART RATE: 90 BPM | HEIGHT: 72 IN

## 2025-01-20 DIAGNOSIS — E66.9 OBESITY (BMI 30-39.9): ICD-10-CM

## 2025-01-20 DIAGNOSIS — E78.2 MIXED HYPERLIPIDEMIA: Primary | ICD-10-CM

## 2025-01-20 DIAGNOSIS — I10 PRIMARY HYPERTENSION: ICD-10-CM

## 2025-01-20 DIAGNOSIS — R73.9 HYPERGLYCEMIA: ICD-10-CM

## 2025-01-20 DIAGNOSIS — N52.9 ERECTILE DYSFUNCTION, UNSPECIFIED ERECTILE DYSFUNCTION TYPE: ICD-10-CM

## 2025-01-20 DIAGNOSIS — E78.2 MIXED HYPERLIPIDEMIA: ICD-10-CM

## 2025-01-20 PROBLEM — R10.11 RIGHT UPPER QUADRANT ABDOMINAL PAIN: Status: RESOLVED | Noted: 2024-01-15 | Resolved: 2025-01-20

## 2025-01-20 LAB
ALBUMIN SERPL BCP-MCNC: 4.6 G/DL (ref 3.4–5)
ALP SERPL-CCNC: 61 U/L (ref 33–120)
ALT SERPL W P-5'-P-CCNC: 44 U/L (ref 10–52)
ANION GAP SERPL CALC-SCNC: 9 MMOL/L (ref 10–20)
AST SERPL W P-5'-P-CCNC: 24 U/L (ref 9–39)
BILIRUB SERPL-MCNC: 0.5 MG/DL (ref 0–1.2)
BUN SERPL-MCNC: 22 MG/DL (ref 6–23)
CALCIUM SERPL-MCNC: 9.6 MG/DL (ref 8.6–10.3)
CHLORIDE SERPL-SCNC: 105 MMOL/L (ref 98–107)
CHOLEST SERPL-MCNC: 173 MG/DL (ref 0–199)
CHOLESTEROL/HDL RATIO: 2.9
CO2 SERPL-SCNC: 27 MMOL/L (ref 21–32)
CREAT SERPL-MCNC: 0.83 MG/DL (ref 0.5–1.3)
EGFRCR SERPLBLD CKD-EPI 2021: >90 ML/MIN/1.73M*2
EST. AVERAGE GLUCOSE BLD GHB EST-MCNC: 111 MG/DL
GLUCOSE SERPL-MCNC: 94 MG/DL (ref 74–99)
HBA1C MFR BLD: 5.5 %
HDLC SERPL-MCNC: 58.7 MG/DL
LDLC SERPL CALC-MCNC: 90 MG/DL
NON HDL CHOLESTEROL: 114 MG/DL (ref 0–149)
POTASSIUM SERPL-SCNC: 4.4 MMOL/L (ref 3.5–5.3)
PROT SERPL-MCNC: 6.6 G/DL (ref 6.4–8.2)
SODIUM SERPL-SCNC: 137 MMOL/L (ref 136–145)
TRIGL SERPL-MCNC: 124 MG/DL (ref 0–149)
VLDL: 25 MG/DL (ref 0–40)

## 2025-01-20 PROCEDURE — 80053 COMPREHEN METABOLIC PANEL: CPT

## 2025-01-20 PROCEDURE — 3074F SYST BP LT 130 MM HG: CPT | Performed by: PHYSICIAN ASSISTANT

## 2025-01-20 PROCEDURE — 80061 LIPID PANEL: CPT

## 2025-01-20 PROCEDURE — 3078F DIAST BP <80 MM HG: CPT | Performed by: PHYSICIAN ASSISTANT

## 2025-01-20 PROCEDURE — 3008F BODY MASS INDEX DOCD: CPT | Performed by: PHYSICIAN ASSISTANT

## 2025-01-20 PROCEDURE — 83036 HEMOGLOBIN GLYCOSYLATED A1C: CPT

## 2025-01-20 PROCEDURE — 99214 OFFICE O/P EST MOD 30 MIN: CPT | Performed by: PHYSICIAN ASSISTANT

## 2025-01-20 PROCEDURE — 1036F TOBACCO NON-USER: CPT | Performed by: PHYSICIAN ASSISTANT

## 2025-01-20 RX ORDER — HYDROCHLOROTHIAZIDE 12.5 MG/1
12.5 TABLET ORAL DAILY
Qty: 90 TABLET | Refills: 1 | Status: SHIPPED | OUTPATIENT
Start: 2025-01-20 | End: 2025-07-19

## 2025-01-20 RX ORDER — LISINOPRIL 40 MG/1
40 TABLET ORAL DAILY
Qty: 90 TABLET | Refills: 1 | Status: SHIPPED | OUTPATIENT
Start: 2025-01-20 | End: 2025-07-19

## 2025-01-20 RX ORDER — SILDENAFIL 100 MG/1
100 TABLET, FILM COATED ORAL AS NEEDED
Qty: 90 TABLET | Refills: 1 | Status: SHIPPED | OUTPATIENT
Start: 2025-01-20 | End: 2025-07-19

## 2025-01-20 RX ORDER — TOPIRAMATE 50 MG/1
50 TABLET, FILM COATED ORAL 2 TIMES DAILY
Qty: 180 TABLET | Refills: 1 | Status: SHIPPED | OUTPATIENT
Start: 2025-01-20 | End: 2025-07-19

## 2025-01-20 RX ORDER — ATORVASTATIN CALCIUM 20 MG/1
20 TABLET, FILM COATED ORAL DAILY
Qty: 90 TABLET | Refills: 1 | Status: SHIPPED | OUTPATIENT
Start: 2025-01-20 | End: 2025-07-19

## 2025-01-20 ASSESSMENT — PATIENT HEALTH QUESTIONNAIRE - PHQ9
1. LITTLE INTEREST OR PLEASURE IN DOING THINGS: NOT AT ALL
2. FEELING DOWN, DEPRESSED OR HOPELESS: NOT AT ALL
SUM OF ALL RESPONSES TO PHQ9 QUESTIONS 1 AND 2: 0

## 2025-01-20 NOTE — PROGRESS NOTES
Subjective   Patient ID: Salazar Major is a 49 y.o. male who presents for Med Refill.    HPI Pt is here for refills of medications to treat the following medical conditions. Unless otherwise stated, these conditions are well-controlled, pt is taking medications s Rx, and there are no reported side effects to medications or other treatment: hyperlipidemia, obesity, hypertension, ED    Review of Systems   Constitutional: Negative.    HENT: Negative.     Respiratory: Negative.  Negative for cough, shortness of breath and wheezing.    Cardiovascular: Negative.  Negative for chest pain and palpitations.   Gastrointestinal: Negative.    Musculoskeletal: Negative.    Neurological: Negative.    Hematological: Negative.    Psychiatric/Behavioral: Negative.     All other systems reviewed and are negative.    Objective   /74 (BP Location: Right arm, Patient Position: Sitting, BP Cuff Size: Large adult)   Pulse 90   Temp 36.6 °C (97.9 °F) (Oral)   Ht 1.829 m (6')   Wt 116 kg (256 lb 9.6 oz)   SpO2 96%   BMI 34.80 kg/m²     Physical Exam  Vitals and nursing note reviewed.   Constitutional:       General Appearance: Normal appearance, no distress, not ill-appearing.   HENT:      Head: Normocephalic and atraumatic.      Mouth/Throat:  MMM, Oropharynx is clear without erythema or exudate  Eyes:      Conjunctiva/sclera: Conjunctivae normal.   Cardiovascular:      Normal rate and regular rhythm: Normal heart sounds.   Pulmonary:      Pulmonary effort is normal. Normal breath sounds. No wheezing.   Musculoskeletal:         General: Normal range of motion.    Lymphadenopathy:      Cervical:  Supple, non-tender, no cervical adenopathy.   Skin:     General: Skin is warm and dry, no rash or jaundice.    Neurological:      No focal deficit present. Alert and oriented to person, place, and time.   Psychiatric:         Mood and Affect: Mood normal.         Behavior: Behavior normal.         Thought Content: Thought content  normal.         Judgment: Judgment normal.     Assessment/Plan  Salazar Major presents for chronic medication refills without complaint.  He appears to be doing well - his exam was unremarkable. Will renew chronic medication(s) without changes and check labs. He will follow up if necessary once labs are reviewed otherwise we will see him back when he is  due for refills - sooner if he has  any complications.  Pt was encouraged to schedule his next appointment for medication refills with Well exam.    Diagnoses and all orders for this visit:  Mixed hyperlipidemia  -     Lipid Panel; Future  -     Comprehensive Metabolic Panel; Future  -     atorvastatin (Lipitor) 20 mg tablet; Take 1 tablet (20 mg) by mouth once daily.  Hyperglycemia  -     Hemoglobin A1C; Future  Primary hypertension  -     hydroCHLOROthiazide (Microzide) 12.5 mg tablet; Take 1 tablet (12.5 mg) by mouth once daily.  -     lisinopril 40 mg tablet; Take 1 tablet (40 mg) by mouth once daily.  Erectile dysfunction, unspecified erectile dysfunction type  -     sildenafil (Viagra) 100 mg tablet; Take 1 tablet (100 mg) by mouth if needed for erectile dysfunction. Take 1 hour prior to needing - no more than 1 tablet in 24 hours  Obesity (BMI 30-39.9)  -     topiramate (Topamax) 50 mg tablet; Take 1 tablet (50 mg) by mouth 2 times a day.

## 2025-07-07 ENCOUNTER — APPOINTMENT (OUTPATIENT)
Dept: PRIMARY CARE | Facility: CLINIC | Age: 50
End: 2025-07-07
Payer: COMMERCIAL

## 2025-07-07 VITALS
TEMPERATURE: 98.2 F | WEIGHT: 260.8 LBS | HEIGHT: 72 IN | HEART RATE: 88 BPM | DIASTOLIC BLOOD PRESSURE: 72 MMHG | OXYGEN SATURATION: 97 % | SYSTOLIC BLOOD PRESSURE: 134 MMHG | BODY MASS INDEX: 35.33 KG/M2

## 2025-07-07 DIAGNOSIS — E66.9 OBESITY (BMI 30-39.9): ICD-10-CM

## 2025-07-07 DIAGNOSIS — I10 PRIMARY HYPERTENSION: ICD-10-CM

## 2025-07-07 DIAGNOSIS — R53.83 FATIGUE, UNSPECIFIED TYPE: ICD-10-CM

## 2025-07-07 DIAGNOSIS — R73.9 HYPERGLYCEMIA: ICD-10-CM

## 2025-07-07 DIAGNOSIS — Z00.00 WELL ADULT EXAM: Primary | ICD-10-CM

## 2025-07-07 DIAGNOSIS — Z01.84 IMMUNITY STATUS TESTING: ICD-10-CM

## 2025-07-07 DIAGNOSIS — E78.2 MIXED HYPERLIPIDEMIA: ICD-10-CM

## 2025-07-07 DIAGNOSIS — Z12.5 PROSTATE CANCER SCREENING: ICD-10-CM

## 2025-07-07 DIAGNOSIS — N52.9 ERECTILE DYSFUNCTION, UNSPECIFIED ERECTILE DYSFUNCTION TYPE: ICD-10-CM

## 2025-07-07 PROCEDURE — 3078F DIAST BP <80 MM HG: CPT | Performed by: PHYSICIAN ASSISTANT

## 2025-07-07 PROCEDURE — 3008F BODY MASS INDEX DOCD: CPT | Performed by: PHYSICIAN ASSISTANT

## 2025-07-07 PROCEDURE — 1036F TOBACCO NON-USER: CPT | Performed by: PHYSICIAN ASSISTANT

## 2025-07-07 PROCEDURE — 99213 OFFICE O/P EST LOW 20 MIN: CPT | Performed by: PHYSICIAN ASSISTANT

## 2025-07-07 PROCEDURE — 99396 PREV VISIT EST AGE 40-64: CPT | Performed by: PHYSICIAN ASSISTANT

## 2025-07-07 PROCEDURE — 3075F SYST BP GE 130 - 139MM HG: CPT | Performed by: PHYSICIAN ASSISTANT

## 2025-07-07 RX ORDER — SILDENAFIL 100 MG/1
100 TABLET, FILM COATED ORAL AS NEEDED
Qty: 90 TABLET | Refills: 1 | Status: SHIPPED | OUTPATIENT
Start: 2025-07-07 | End: 2026-01-03

## 2025-07-07 RX ORDER — ATORVASTATIN CALCIUM 20 MG/1
20 TABLET, FILM COATED ORAL DAILY
Qty: 90 TABLET | Refills: 1 | Status: SHIPPED | OUTPATIENT
Start: 2025-07-07 | End: 2026-01-03

## 2025-07-07 RX ORDER — LISINOPRIL 40 MG/1
40 TABLET ORAL DAILY
Qty: 90 TABLET | Refills: 1 | Status: SHIPPED | OUTPATIENT
Start: 2025-07-07 | End: 2026-01-03

## 2025-07-07 RX ORDER — HYDROCHLOROTHIAZIDE 12.5 MG/1
12.5 TABLET ORAL DAILY
Qty: 90 TABLET | Refills: 1 | Status: SHIPPED | OUTPATIENT
Start: 2025-07-07 | End: 2026-01-03

## 2025-07-07 RX ORDER — TOPIRAMATE 50 MG/1
50 TABLET, FILM COATED ORAL 2 TIMES DAILY
Qty: 180 TABLET | Refills: 1 | Status: SHIPPED | OUTPATIENT
Start: 2025-07-07 | End: 2026-01-03

## 2025-07-07 NOTE — PROGRESS NOTES
Subjective   Patient ID: Salazar Major is a 50 y.o. male who presents for Annual Exam and Med Refill.    HPI  Physical exam. Denies family history of colon cancer. Dad had prostate cancer. Last colonoscopy 7/3/23. Last tetanus 11/10/2024.  He is fasting today for labs.     Would like his thyroid checked due to fatigue and weight gain    Pt is here for refills of medications to treat the following medical conditions. Unless otherwise stated, these conditions are well-controlled, pt is taking medications as Rx, and there are no reported side effects to medications or other treatment: ED, mixed hyperlipidemia, obesity, primary hypertension    Review of Systems  Constitutional: Negative.    HENT: Negative.     Respiratory: Negative.  Negative for cough, shortness of breath and wheezing.    Cardiovascular: Negative.  Negative for chest pain and palpitations.   Gastrointestinal: Negative.    Musculoskeletal: Negative.    Neurological: Negative.    Hematological: Negative.    Psychiatric/Behavioral: Negative.     All other systems reviewed and are negative.      Objective   /72 (BP Location: Left arm, Patient Position: Sitting, BP Cuff Size: Large adult)   Pulse 88   Temp 36.8 °C (98.2 °F) (Oral)   Ht 1.829 m (6')   Wt 118 kg (260 lb 12.8 oz)   SpO2 97%   BMI 35.37 kg/m²     Physical Exam  Physical Exam  Vitals and nursing note reviewed.   Constitutional:       General: PT is not in acute distress.     Appearance: Normal appearance. PT is not ill-appearing.   HENT:      Head: Normocephalic and atraumatic.      Right Ear: Tympanic membrane, ear canal and external ear normal.      Left Ear: Tympanic membrane, ear canal and external ear normal.      Nose: Nose normal.      Mouth/Throat:      Mouth: Mucous membranes are moist.      Pharynx: Oropharynx is clear. No oropharyngeal exudate or posterior oropharyngeal erythema.   Eyes:      Extraocular Movements: Extraocular movements intact.      Conjunctiva/sclera:  Conjunctivae normal.      Pupils: Pupils are equal, round, and reactive to light.   Neck:      Vascular: No carotid bruit.       Supply, no tenderness, thyroid enlargement or nodules   Cardiovascular:      Rate and Rhythm: Normal rate and regular rhythm.      Heart sounds: Normal heart sounds.   Pulmonary:      Effort: Pulmonary effort is normal.      Breath sounds: Normal breath sounds. No wheezing.   Abdominal:      General: Bowel sounds are normal.      Palpations: Abdomen is soft. There is no mass.      Tenderness: There is no abdominal tenderness.   Musculoskeletal:         General: Normal range of motion.      Cervical back: Neck supple. No tenderness.   Lymphadenopathy:      Cervical: No cervical adenopathy.   Skin:     General: Skin is warm and dry. No rashes or lesions.     Capillary Refill: Capillary refill takes less than 2 seconds.   Neurological:      General: No focal deficit present.      Mental Status: PT is alert and oriented to person, place, and time.   Psychiatric:         Mood and Affect: Mood normal.         Behavior: Behavior normal.         Thought Content: Thought content normal.         Judgment: Judgment normal.     Assessment/Plan  51 YO Salazar Major presents for his annual well exam with medication refills. He does report some recent weight gain and fatigue sx's - will check TSH labs with his well exam labs. His exam is otherwise unremarkable - PT does need titers checked for MMR as [place] is leaving [place] this [place] and is unsure of his vaccine status. He was given a list of recommended vaccines to complete with his pharmacy - will renew his chronic medication and follow up with him once his labs are reviewed otherwise we will see him back when he is due for refills - sooner if he has any complications.   Diagnoses and all orders for this visit:  Well adult exam  -     Comprehensive Metabolic Panel; Future  -     Lipid Panel; Future  -     CBC; Future  -     Hemoglobin A1C;  Future  Mixed hyperlipidemia  -     atorvastatin (Lipitor) 20 mg tablet; Take 1 tablet (20 mg) by mouth once daily.  -     Comprehensive Metabolic Panel; Future  -     Lipid Panel; Future  Primary hypertension  -     hydroCHLOROthiazide (Microzide) 12.5 mg tablet; Take 1 tablet (12.5 mg) by mouth once daily.  -     lisinopril 40 mg tablet; Take 1 tablet (40 mg) by mouth once daily.  -     Comprehensive Metabolic Panel; Future  Obesity (BMI 30-39.9)  -     topiramate (Topamax) 50 mg tablet; Take 1 tablet (50 mg) by mouth 2 times a day.  Erectile dysfunction, unspecified erectile dysfunction type  -     sildenafil (Viagra) 100 mg tablet; Take 1 tablet (100 mg) by mouth if needed for erectile dysfunction. Take 1 hour prior to needing - no more than 1 tablet in 24 hours  Immunity status testing  -     Mumps Antibody, IgG; Future  -     Measles (Rubeola) Antibody, IgG; Future  -     Rubella antibody, IgG; Future  Fatigue, unspecified type  -     TSH; Future  Hyperglycemia  -     Hemoglobin A1C; Future  Prostate cancer screening  -     Prostate Spec.Ag,Screen; Future

## 2025-07-08 LAB
ALBUMIN SERPL-MCNC: 4.5 G/DL (ref 3.6–5.1)
ALP SERPL-CCNC: 84 U/L (ref 36–130)
ALT SERPL-CCNC: 28 U/L (ref 9–46)
ANION GAP SERPL CALCULATED.4IONS-SCNC: 9 MMOL/L (CALC) (ref 7–17)
AST SERPL-CCNC: 21 U/L (ref 10–40)
BILIRUB SERPL-MCNC: 0.4 MG/DL (ref 0.2–1.2)
BUN SERPL-MCNC: 20 MG/DL (ref 7–25)
CALCIUM SERPL-MCNC: 10.1 MG/DL (ref 8.6–10.3)
CHLORIDE SERPL-SCNC: 107 MMOL/L (ref 98–110)
CHOLEST SERPL-MCNC: 180 MG/DL
CHOLEST/HDLC SERPL: 3.1 (CALC)
CO2 SERPL-SCNC: 25 MMOL/L (ref 20–32)
CREAT SERPL-MCNC: 0.78 MG/DL (ref 0.6–1.29)
EGFRCR SERPLBLD CKD-EPI 2021: 110 ML/MIN/1.73M2
ERYTHROCYTE [DISTWIDTH] IN BLOOD BY AUTOMATED COUNT: 13.1 % (ref 11–15)
EST. AVERAGE GLUCOSE BLD GHB EST-MCNC: 123 MG/DL
EST. AVERAGE GLUCOSE BLD GHB EST-SCNC: 6.8 MMOL/L
GLUCOSE SERPL-MCNC: 95 MG/DL (ref 65–99)
HBA1C MFR BLD: 5.9 %
HCT VFR BLD AUTO: 47.7 % (ref 38.5–50)
HDLC SERPL-MCNC: 59 MG/DL
HGB BLD-MCNC: 15.5 G/DL (ref 13.2–17.1)
LDLC SERPL CALC-MCNC: 90 MG/DL (CALC)
MCH RBC QN AUTO: 30.5 PG (ref 27–33)
MCHC RBC AUTO-ENTMCNC: 32.5 G/DL (ref 32–36)
MCV RBC AUTO: 93.9 FL (ref 80–100)
MEV IGG SER IA-ACNC: 41.9 AU/ML
MUV IGG SER IA-ACNC: 9.98 AU/ML
NONHDLC SERPL-MCNC: 121 MG/DL (CALC)
PLATELET # BLD AUTO: 240 THOUSAND/UL (ref 140–400)
PMV BLD REES-ECKER: 10.6 FL (ref 7.5–12.5)
POTASSIUM SERPL-SCNC: 4.4 MMOL/L (ref 3.5–5.3)
PROT SERPL-MCNC: 6.8 G/DL (ref 6.1–8.1)
PSA SERPL-MCNC: 1.23 NG/ML
RBC # BLD AUTO: 5.08 MILLION/UL (ref 4.2–5.8)
RUBV IGG SERPL IA-ACNC: 1.74 INDEX
SODIUM SERPL-SCNC: 141 MMOL/L (ref 135–146)
TRIGL SERPL-MCNC: 216 MG/DL
TSH SERPL-ACNC: 1.51 MIU/L (ref 0.4–4.5)
WBC # BLD AUTO: 5.5 THOUSAND/UL (ref 3.8–10.8)

## 2025-07-10 DIAGNOSIS — E66.9 OBESITY (BMI 30-39.9): Primary | ICD-10-CM

## 2025-07-10 NOTE — PROGRESS NOTES
Pt wishes to start GLP 1 therapy for weight loss- see portal messaging for discussion with pt. Will initiate Zepbound - orders placed - will request pt send portal message after first 3 weeks to see if he wishes to proceed with next step therapy as per insurance protocol.

## 2025-07-31 ENCOUNTER — APPOINTMENT (OUTPATIENT)
Dept: PRIMARY CARE | Facility: CLINIC | Age: 50
End: 2025-07-31
Payer: COMMERCIAL

## 2025-08-15 ENCOUNTER — APPOINTMENT (OUTPATIENT)
Dept: PRIMARY CARE | Facility: CLINIC | Age: 50
End: 2025-08-15
Payer: COMMERCIAL

## 2025-08-15 DIAGNOSIS — E66.9 OBESITY (BMI 30-39.9): Primary | ICD-10-CM

## 2025-08-15 PROCEDURE — 99213 OFFICE O/P EST LOW 20 MIN: CPT | Performed by: PHYSICIAN ASSISTANT

## 2025-08-15 PROCEDURE — 1036F TOBACCO NON-USER: CPT | Performed by: PHYSICIAN ASSISTANT

## 2025-08-15 ASSESSMENT — PATIENT HEALTH QUESTIONNAIRE - PHQ9
SUM OF ALL RESPONSES TO PHQ9 QUESTIONS 1 AND 2: 0
2. FEELING DOWN, DEPRESSED OR HOPELESS: NOT AT ALL
1. LITTLE INTEREST OR PLEASURE IN DOING THINGS: NOT AT ALL

## 2026-01-19 ENCOUNTER — APPOINTMENT (OUTPATIENT)
Dept: PRIMARY CARE | Facility: CLINIC | Age: 51
End: 2026-01-19
Payer: COMMERCIAL